# Patient Record
Sex: FEMALE | Race: BLACK OR AFRICAN AMERICAN | Employment: PART TIME | URBAN - METROPOLITAN AREA
[De-identification: names, ages, dates, MRNs, and addresses within clinical notes are randomized per-mention and may not be internally consistent; named-entity substitution may affect disease eponyms.]

---

## 2021-07-16 ENCOUNTER — HOSPITAL ENCOUNTER (EMERGENCY)
Age: 66
Discharge: HOME | End: 2021-07-17
Attending: EMERGENCY MEDICINE
Payer: MEDICARE

## 2021-07-16 DIAGNOSIS — E86.0 DEHYDRATION: Primary | ICD-10-CM

## 2021-07-16 DIAGNOSIS — R42 ORTHOSTATIC DIZZINESS: ICD-10-CM

## 2021-07-16 DIAGNOSIS — R25.1 TREMULOUSNESS: ICD-10-CM

## 2021-07-16 LAB
ANION GAP BLD CALC-SCNC: 12 MMOL/L (ref 10–20)
APPEARANCE UR: CLEAR
BACTERIA URNS QL MICRO: NEGATIVE /HPF
BASOPHILS # BLD: 0 K/UL (ref 0–0.1)
BASOPHILS NFR BLD: 1 % (ref 0–1)
BILIRUB UR QL: NEGATIVE
CA-I BLD-MCNC: 1.07 MMOL/L (ref 1.12–1.32)
CHLORIDE BLD-SCNC: 102 MMOL/L (ref 98–107)
CO2 BLD-SCNC: 25.9 MMOL/L (ref 21–32)
COLOR UR: ABNORMAL
CREAT BLD-MCNC: 0.66 MG/DL (ref 0.6–1.3)
DIFFERENTIAL METHOD BLD: NORMAL
EOSINOPHIL # BLD: 0 K/UL (ref 0–0.4)
EOSINOPHIL NFR BLD: 0 % (ref 0–7)
EPITH CASTS URNS QL MICRO: ABNORMAL /LPF
ERYTHROCYTE [DISTWIDTH] IN BLOOD BY AUTOMATED COUNT: 11.9 % (ref 11.5–14.5)
GLUCOSE BLD-MCNC: 119 MG/DL (ref 65–100)
GLUCOSE UR STRIP.AUTO-MCNC: NEGATIVE MG/DL
HCT VFR BLD AUTO: 37.9 % (ref 35–47)
HGB BLD-MCNC: 12.1 G/DL (ref 11.5–16)
HGB UR QL STRIP: NEGATIVE
HYALINE CASTS URNS QL MICRO: ABNORMAL /LPF (ref 0–5)
IMM GRANULOCYTES # BLD AUTO: 0 K/UL (ref 0–0.04)
IMM GRANULOCYTES NFR BLD AUTO: 0 % (ref 0–0.5)
KETONES UR QL STRIP.AUTO: ABNORMAL MG/DL
LEUKOCYTE ESTERASE UR QL STRIP.AUTO: NEGATIVE
LYMPHOCYTES # BLD: 1.5 K/UL (ref 0.8–3.5)
LYMPHOCYTES NFR BLD: 29 % (ref 12–49)
MCH RBC QN AUTO: 28.7 PG (ref 26–34)
MCHC RBC AUTO-ENTMCNC: 31.9 G/DL (ref 30–36.5)
MCV RBC AUTO: 89.8 FL (ref 80–99)
MONOCYTES # BLD: 0.5 K/UL (ref 0–1)
MONOCYTES NFR BLD: 9 % (ref 5–13)
NEUTS SEG # BLD: 3.1 K/UL (ref 1.8–8)
NEUTS SEG NFR BLD: 61 % (ref 32–75)
NITRITE UR QL STRIP.AUTO: NEGATIVE
NRBC # BLD: 0 K/UL (ref 0–0.01)
NRBC BLD-RTO: 0 PER 100 WBC
PH UR STRIP: 5.5 [PH] (ref 5–8)
PLATELET # BLD AUTO: 224 K/UL (ref 150–400)
PMV BLD AUTO: 10.6 FL (ref 8.9–12.9)
POTASSIUM BLD-SCNC: 4.3 MMOL/L (ref 3.5–5.1)
PROT UR STRIP-MCNC: NEGATIVE MG/DL
RBC # BLD AUTO: 4.22 M/UL (ref 3.8–5.2)
RBC #/AREA URNS HPF: ABNORMAL /HPF (ref 0–5)
SERVICE CMNT-IMP: ABNORMAL
SODIUM BLD-SCNC: 139 MMOL/L (ref 136–145)
SP GR UR REFRACTOMETRY: 1.02 (ref 1–1.03)
UA: UC IF INDICATED,UAUC: ABNORMAL
UROBILINOGEN UR QL STRIP.AUTO: 0.2 EU/DL (ref 0.2–1)
WBC # BLD AUTO: 5.1 K/UL (ref 3.6–11)
WBC URNS QL MICRO: ABNORMAL /HPF (ref 0–4)

## 2021-07-16 PROCEDURE — 93005 ELECTROCARDIOGRAM TRACING: CPT

## 2021-07-16 PROCEDURE — 81001 URINALYSIS AUTO W/SCOPE: CPT

## 2021-07-16 PROCEDURE — 99285 EMERGENCY DEPT VISIT HI MDM: CPT

## 2021-07-16 PROCEDURE — 36415 COLL VENOUS BLD VENIPUNCTURE: CPT

## 2021-07-16 PROCEDURE — 74011250637 HC RX REV CODE- 250/637: Performed by: EMERGENCY MEDICINE

## 2021-07-16 PROCEDURE — 80047 BASIC METABLC PNL IONIZED CA: CPT

## 2021-07-16 PROCEDURE — 74011250636 HC RX REV CODE- 250/636: Performed by: EMERGENCY MEDICINE

## 2021-07-16 PROCEDURE — 85025 COMPLETE CBC W/AUTO DIFF WBC: CPT

## 2021-07-16 RX ORDER — HYDROXYZINE 25 MG/1
25 TABLET, FILM COATED ORAL
Status: COMPLETED | OUTPATIENT
Start: 2021-07-16 | End: 2021-07-16

## 2021-07-16 RX ORDER — LISINOPRIL 40 MG/1
40 TABLET ORAL DAILY
COMMUNITY

## 2021-07-16 RX ORDER — ROSUVASTATIN CALCIUM 40 MG/1
40 TABLET, COATED ORAL
COMMUNITY

## 2021-07-16 RX ORDER — AMLODIPINE BESYLATE 10 MG/1
10 TABLET ORAL DAILY
COMMUNITY

## 2021-07-16 RX ORDER — ASPIRIN 325 MG
325 TABLET ORAL DAILY
COMMUNITY

## 2021-07-16 RX ADMIN — SODIUM CHLORIDE 500 ML: 9 INJECTION, SOLUTION INTRAVENOUS at 22:11

## 2021-07-16 RX ADMIN — HYDROXYZINE HYDROCHLORIDE 25 MG: 25 TABLET, FILM COATED ORAL at 22:11

## 2021-07-17 VITALS
HEART RATE: 90 BPM | RESPIRATION RATE: 18 BRPM | WEIGHT: 170 LBS | SYSTOLIC BLOOD PRESSURE: 119 MMHG | BODY MASS INDEX: 27.32 KG/M2 | TEMPERATURE: 97.7 F | DIASTOLIC BLOOD PRESSURE: 59 MMHG | HEIGHT: 66 IN | OXYGEN SATURATION: 96 %

## 2021-07-17 LAB
ATRIAL RATE: 103 BPM
CALCULATED P AXIS, ECG09: 46 DEGREES
CALCULATED R AXIS, ECG10: 4 DEGREES
CALCULATED T AXIS, ECG11: 27 DEGREES
DIAGNOSIS, 93000: NORMAL
P-R INTERVAL, ECG05: 130 MS
Q-T INTERVAL, ECG07: 342 MS
QRS DURATION, ECG06: 76 MS
QTC CALCULATION (BEZET), ECG08: 448 MS
VENTRICULAR RATE, ECG03: 103 BPM

## 2021-07-17 NOTE — DISCHARGE INSTRUCTIONS
It was a pleasure taking care of you in our Emergency Department today. We know that when you come to [facility], you are entrusting us with your health, comfort, and safety. Our physicians and nurses honor that trust, and truly appreciate the opportunity to care for you and your loved ones. We also value your feedback. If you receive a survey about your Emergency Department experience today, please fill it out. We care about our patients' feedback, and we listen to what you have to say. Thank you!       Dr. Valencia Godoy MD.

## 2021-07-17 NOTE — ED PROVIDER NOTES
EMERGENCY DEPARTMENT HISTORY AND PHYSICAL EXAM     ------------------------------------------------------------------------------------------------------  Please note that this dictation was completed with [employer], the [employer] voice recognition software. Quite often unanticipated grammatical, syntax, homophones, and other interpretive errors are inadvertently transcribed by the computer software. Please disregard these errors. Please excuse any errors that have escaped final proofreading.  -----------------------------------------------------------------------------------------------------------------    Date: 2021  Patient Name: Connie Fung    History of Presenting Illness     Chief Complaint   Patient presents with    Anxiety     Patient states that she feels \"jittery\" and \"off\" for the past hour. She feels \"woozy\" when she turns her head. Denies any N/V/D. Patient states that her sister just  and she may be anxious about that. History Provided By: Patient    HPI: Connie Fung is a 77 y.o. female, with significant pmhx of hypertension, who presents via private vehicle to the ED with c/o \"feeling off. \"  As well as the degree sensations been ongoing throughout today. Notes that she had a family member passed away and believes that her symptoms are attributed to this. Patient reports having told in the past of hypokalemia by her primary care doctor she occasionally gets intermittent foot cramping. Is unsure if her potassium is running low she has not filled her prescription for her potassium supplement yet. Denies associated chest pain, shortness of breath, nausea, vomiting, abdominal pain. PCP: Jodi Aggarwal MD    Social Hx: denies tobacco, denies EtOH, denies recreational/ Illicit Drugs     There are no other complaints, changes, or physical findings at this time.      No Known Allergies          Past History     Past Medical History:  Past Medical History:   Diagnosis Date    Hypertension        Past Surgical History:  History reviewed. No pertinent surgical history. Family History:  History reviewed. No pertinent family history. Social History:  Social History     Tobacco Use    Smoking status: Never Smoker    Smokeless tobacco: Never Used   Substance Use Topics    Alcohol use: Yes     Comment: everyday - drink beer and liquor    Drug use: Never       Allergies:  No Known Allergies      Review of Systems   Review of Systems   Constitutional: Negative. Negative for fever. Eyes: Negative. Respiratory: Negative. Negative for shortness of breath. Cardiovascular: Negative for chest pain. Gastrointestinal: Negative for abdominal pain, nausea and vomiting. Endocrine: Negative. Genitourinary: Negative. Negative for difficulty urinating, dysuria and hematuria. Musculoskeletal: Negative. Skin: Negative. Neurological: Positive for tremors (jittery feeling). Psychiatric/Behavioral: Negative for suicidal ideas. The patient is nervous/anxious. All other systems reviewed and are negative. Physical Exam   Physical Exam  Vitals and nursing note reviewed. Constitutional:       General: She is not in acute distress. Appearance: She is well-developed. She is not diaphoretic. HENT:      Head: Normocephalic and atraumatic. Nose: Nose normal.   Eyes:      General: No scleral icterus. Conjunctiva/sclera: Conjunctivae normal.   Neck:      Trachea: No tracheal deviation. Cardiovascular:      Rate and Rhythm: Normal rate and regular rhythm. Heart sounds: Normal heart sounds. No murmur heard. No friction rub. Pulmonary:      Effort: Pulmonary effort is normal. No respiratory distress. Breath sounds: Normal breath sounds. No stridor. No wheezing or rales. Abdominal:      General: Bowel sounds are normal. There is no distension. Palpations: Abdomen is soft. Tenderness: There is no abdominal tenderness. There is no rebound. Musculoskeletal:         General: No tenderness. Normal range of motion. Cervical back: Normal range of motion. Skin:     General: Skin is warm and dry. Findings: No rash. Neurological:      Mental Status: She is alert and oriented to person, place, and time. Cranial Nerves: No cranial nerve deficit. Psychiatric:         Mood and Affect: Mood is anxious. Speech: Speech normal.         Behavior: Behavior normal.         Thought Content: Thought content normal.         Judgment: Judgment normal.           Diagnostic Study Results     Labs -     Recent Results (from the past 12 hour(s))   POC CHEM8    Collection Time: 07/16/21  9:25 PM   Result Value Ref Range    Calcium, ionized (POC) 1.07 (L) 1.12 - 1.32 mmol/L    Sodium (POC) 139 136 - 145 mmol/L    Potassium (POC) 4.3 3.5 - 5.1 mmol/L    Chloride (POC) 102 98 - 107 mmol/L    CO2 (POC) 25.9 21 - 32 mmol/L    Anion gap (POC) 12 10 - 20 mmol/L    Glucose (POC) 119 (H) 65 - 100 mg/dL    Creatinine (POC) 0.66 0.6 - 1.3 mg/dL    GFRAA, POC >60 >60 ml/min/1.73m2    GFRNA, POC >60 >60 ml/min/1.73m2    Comment Comment Not Indicated.      URINALYSIS W/ REFLEX CULTURE    Collection Time: 07/16/21  9:31 PM    Specimen: Urine   Result Value Ref Range    Color YELLOW/STRAW      Appearance CLEAR CLEAR      Specific gravity 1.020 1.003 - 1.030      pH (UA) 5.5 5.0 - 8.0      Protein Negative NEG mg/dL    Glucose Negative NEG mg/dL    Ketone TRACE (A) NEG mg/dL    Bilirubin Negative NEG      Blood Negative NEG      Urobilinogen 0.2 0.2 - 1.0 EU/dL    Nitrites Negative NEG      Leukocyte Esterase Negative NEG      WBC 0-4 0 - 4 /hpf    RBC 0-5 0 - 5 /hpf    Epithelial cells FEW FEW /lpf    Bacteria Negative NEG /hpf    UA:UC IF INDICATED CULTURE NOT INDICATED BY UA RESULT CNI      Hyaline cast 0-2 0 - 5 /lpf   CBC WITH AUTOMATED DIFF    Collection Time: 07/16/21  9:31 PM   Result Value Ref Range    WBC 5.1 3.6 - 11.0 K/uL    RBC 4.22 3.80 - 5.20 M/uL HGB 12.1 11.5 - 16.0 g/dL    HCT 37.9 35.0 - 47.0 %    MCV 89.8 80.0 - 99.0 FL    MCH 28.7 26.0 - 34.0 PG    MCHC 31.9 30.0 - 36.5 g/dL    RDW 11.9 11.5 - 14.5 %    PLATELET 697 320 - 447 K/uL    MPV 10.6 8.9 - 12.9 FL    NRBC 0.0 0  WBC    ABSOLUTE NRBC 0.00 0.00 - 0.01 K/uL    NEUTROPHILS 61 32 - 75 %    LYMPHOCYTES 29 12 - 49 %    MONOCYTES 9 5 - 13 %    EOSINOPHILS 0 0 - 7 %    BASOPHILS 1 0 - 1 %    IMMATURE GRANULOCYTES 0 0.0 - 0.5 %    ABS. NEUTROPHILS 3.1 1.8 - 8.0 K/UL    ABS. LYMPHOCYTES 1.5 0.8 - 3.5 K/UL    ABS. MONOCYTES 0.5 0.0 - 1.0 K/UL    ABS. EOSINOPHILS 0.0 0.0 - 0.4 K/UL    ABS. BASOPHILS 0.0 0.0 - 0.1 K/UL    ABS. IMM. GRANS. 0.0 0.00 - 0.04 K/UL    DF AUTOMATED     EKG, 12 LEAD, INITIAL    Collection Time: 07/16/21  9:42 PM   Result Value Ref Range    Ventricular Rate 103 BPM    Atrial Rate 103 BPM    P-R Interval 130 ms    QRS Duration 76 ms    Q-T Interval 342 ms    QTC Calculation (Bezet) 448 ms    Calculated P Axis 46 degrees    Calculated R Axis 4 degrees    Calculated T Axis 27 degrees    Diagnosis       Sinus tachycardia  Minimal voltage criteria for LVH, may be normal variant  When compared with ECG of 25-OCT-2004 12:22,  Previous ECG has undetermined rhythm, needs review         Radiologic Studies -   No orders to display     CT Results  (Last 48 hours)    None        CXR Results  (Last 48 hours)    None            Medical Decision Making   I am the first provider for this patient. I reviewed the vital signs, available nursing notes, past medical history, past surgical history, family history and social history. Vital Signs-Reviewed the patient's vital signs.   Patient Vitals for the past 12 hrs:   Temp Pulse Resp BP SpO2   07/16/21 2334  99 17  98 %   07/16/21 2330  (!) 102 21 128/66 98 %   07/16/21 2328    (!) 143/78    07/16/21 2327    (!) 145/69    07/16/21 2327    (!) 144/62    07/16/21 2259  (!) 107 20  98 %   07/16/21 2258  87 17  96 %   07/16/21 2245  96 15 123/60 96 %   07/16/21 2154  (!) 102 19  98 %   07/16/21 2005 97.7 °F (36.5 °C) (!) 129 20 (!) 170/89 98 %       Pulse Oximetry Analysis - 98% on RA Normal      Provider Notes (Medical Decision Making):     DDX:  Grieving process, electrolyte abnormality, dehydration, UTI, arrhythmia    Plan:  Labs, UA, EKG    Impression:  Orthostatic, dehydration    ED Course:   Initial assessment performed. The patients presenting problems have been discussed, and they are in agreement with the care plan formulated and outlined with them. I have encouraged them to ask questions as they arise throughout their visit. I reviewed our electronic medical record system for any past medical records that were available that may contribute to the patients current condition, the nursing notes and and vital signs from today's visit  Nursing notes will be reviewed as they become available in realtime while the pt has been in the ED. Marnie Clark MD    I personally reviewed/interpreted pt's imaging. Agree with official read by radiology as noted above. Marnie Clark MD      11:51 PM  Progress note:  Pt noted to be feeling better, ready for discharge. Discussed lab and imaging findings with pt, specifically noting normal potassium. Pt will follow up with primary care as instructed. All questions have been answered, pt voiced understanding and agreement with plan. Specific return precautions provided in addition to instructions for pt to return to the ED immediately should sx worsen at any time. Marnie Clark MD             Critical Care Time:     none      Diagnosis     Clinical Impression:   1. Dehydration    2. Tremulousness    3. Orthostatic dizziness        PLAN:  1. Current Discharge Medication List        2.    Follow-up Information     Follow up With Specialties Details Why Cassandra Farah MD Internal Medicine Schedule an appointment as soon as possible for a visit in 2 days  [address] [facility]  [phone]          Return to ED if worse     Disposition:    11:51 PM   The patient's results have been reviewed with family and/or caregiver. They verbally convey their understanding and agreement of the patient's signs, symptoms, diagnosis, treatment and prognosis and additionally agree to follow up as recommended in the discharge instructions or to return to the Emergency Room should the patient's condition change prior to their follow-up appointment. The family and/or caregiver verbally agrees with the care-plan and all of their questions have been answered. The discharge instructions have also been provided to the them with educational information regarding the patient's diagnosis as well a list of reasons why the patient would want to return to the ER prior to their follow-up appointment should their condition change.   Kem Hairston MD

## 2021-07-17 NOTE — ED NOTES
Pt presents to the ED for jitteriness and feeling \"off\". Pt states symptoms started 2 hours ago. Pt states she had a sister that just passed away and she feels like her symptoms might be related to that.

## 2021-11-04 ENCOUNTER — TRANSCRIBE ORDER (OUTPATIENT)
Dept: SCHEDULING | Age: 66
End: 2021-11-04

## 2021-11-04 DIAGNOSIS — M77.9 TENDINITIS: Primary | ICD-10-CM

## 2021-11-20 ENCOUNTER — HOSPITAL ENCOUNTER (OUTPATIENT)
Dept: MRI IMAGING | Age: 66
Discharge: HOME | End: 2021-11-20
Attending: ORTHOPAEDIC SURGERY

## 2021-11-20 DIAGNOSIS — M77.9 TENDINITIS: ICD-10-CM

## 2021-12-02 ENCOUNTER — APPOINTMENT (OUTPATIENT)
Dept: CT IMAGING | Age: 66
End: 2021-12-02
Attending: EMERGENCY MEDICINE
Payer: MEDICARE

## 2021-12-02 ENCOUNTER — APPOINTMENT (OUTPATIENT)
Dept: GENERAL RADIOLOGY | Age: 66
End: 2021-12-02
Attending: EMERGENCY MEDICINE
Payer: MEDICARE

## 2021-12-02 ENCOUNTER — HOSPITAL ENCOUNTER (EMERGENCY)
Age: 66
Discharge: HOME | End: 2021-12-02
Attending: EMERGENCY MEDICINE
Payer: MEDICARE

## 2021-12-02 VITALS
OXYGEN SATURATION: 98 % | DIASTOLIC BLOOD PRESSURE: 81 MMHG | RESPIRATION RATE: 18 BRPM | SYSTOLIC BLOOD PRESSURE: 127 MMHG | TEMPERATURE: 98.5 F | HEIGHT: 66 IN | HEART RATE: 134 BPM | BODY MASS INDEX: 26.61 KG/M2 | WEIGHT: 165.57 LBS

## 2021-12-02 DIAGNOSIS — J18.9 PNEUMONIA OF LEFT UPPER LOBE DUE TO INFECTIOUS ORGANISM: Primary | ICD-10-CM

## 2021-12-02 LAB
ALBUMIN SERPL-MCNC: 2.6 G/DL (ref 3.5–5)
ALBUMIN/GLOB SERPL: 0.4 {RATIO} (ref 1.1–2.2)
ALP SERPL-CCNC: 69 U/L (ref 45–117)
ALT SERPL-CCNC: 21 U/L (ref 12–78)
ANION GAP SERPL CALC-SCNC: 6 MMOL/L (ref 5–15)
AST SERPL-CCNC: 33 U/L (ref 15–37)
ATRIAL RATE: 121 BPM
BASOPHILS # BLD: 0 K/UL (ref 0–0.1)
BASOPHILS NFR BLD: 0 % (ref 0–1)
BILIRUB SERPL-MCNC: 0.7 MG/DL (ref 0.2–1)
BNP SERPL-MCNC: 107 PG/ML
BUN SERPL-MCNC: 10 MG/DL (ref 6–20)
BUN/CREAT SERPL: 12 (ref 12–20)
CALCIUM SERPL-MCNC: 9.1 MG/DL (ref 8.5–10.1)
CALCULATED P AXIS, ECG09: 43 DEGREES
CALCULATED R AXIS, ECG10: 11 DEGREES
CALCULATED T AXIS, ECG11: 47 DEGREES
CHLORIDE SERPL-SCNC: 102 MMOL/L (ref 97–108)
CO2 SERPL-SCNC: 26 MMOL/L (ref 21–32)
CREAT SERPL-MCNC: 0.81 MG/DL (ref 0.55–1.02)
DIAGNOSIS, 93000: NORMAL
DIFFERENTIAL METHOD BLD: ABNORMAL
EOSINOPHIL # BLD: 0 K/UL (ref 0–0.4)
EOSINOPHIL NFR BLD: 0 % (ref 0–7)
ERYTHROCYTE [DISTWIDTH] IN BLOOD BY AUTOMATED COUNT: 11.6 % (ref 11.5–14.5)
GLOBULIN SER CALC-MCNC: 6.3 G/DL (ref 2–4)
GLUCOSE SERPL-MCNC: 213 MG/DL (ref 65–100)
HCT VFR BLD AUTO: 35 % (ref 35–47)
HGB BLD-MCNC: 11.4 G/DL (ref 11.5–16)
IMM GRANULOCYTES # BLD AUTO: 0.1 K/UL (ref 0–0.04)
IMM GRANULOCYTES NFR BLD AUTO: 1 % (ref 0–0.5)
LACTATE SERPL-SCNC: 1 MMOL/L (ref 0.4–2)
LYMPHOCYTES # BLD: 0.7 K/UL (ref 0.8–3.5)
LYMPHOCYTES NFR BLD: 6 % (ref 12–49)
MCH RBC QN AUTO: 28.7 PG (ref 26–34)
MCHC RBC AUTO-ENTMCNC: 32.6 G/DL (ref 30–36.5)
MCV RBC AUTO: 88.2 FL (ref 80–99)
MONOCYTES # BLD: 0.8 K/UL (ref 0–1)
MONOCYTES NFR BLD: 7 % (ref 5–13)
NEUTS SEG # BLD: 9.8 K/UL (ref 1.8–8)
NEUTS SEG NFR BLD: 86 % (ref 32–75)
NRBC # BLD: 0 K/UL (ref 0–0.01)
NRBC BLD-RTO: 0 PER 100 WBC
P-R INTERVAL, ECG05: 130 MS
PLATELET # BLD AUTO: 274 K/UL (ref 150–400)
PMV BLD AUTO: 10.6 FL (ref 8.9–12.9)
POTASSIUM SERPL-SCNC: 5 MMOL/L (ref 3.5–5.1)
PROT SERPL-MCNC: 8.9 G/DL (ref 6.4–8.2)
Q-T INTERVAL, ECG07: 302 MS
QRS DURATION, ECG06: 74 MS
QTC CALCULATION (BEZET), ECG08: 428 MS
RBC # BLD AUTO: 3.97 M/UL (ref 3.8–5.2)
RBC MORPH BLD: ABNORMAL
SODIUM SERPL-SCNC: 134 MMOL/L (ref 136–145)
TROPONIN-HIGH SENSITIVITY: <4 NG/L (ref 0–51)
VENTRICULAR RATE, ECG03: 121 BPM
WBC # BLD AUTO: 11.4 K/UL (ref 3.6–11)
WBC MORPH BLD: ABNORMAL

## 2021-12-02 PROCEDURE — 83605 ASSAY OF LACTIC ACID: CPT

## 2021-12-02 PROCEDURE — 74011000636 HC RX REV CODE- 636: Performed by: EMERGENCY MEDICINE

## 2021-12-02 PROCEDURE — 71045 X-RAY EXAM CHEST 1 VIEW: CPT

## 2021-12-02 PROCEDURE — 84484 ASSAY OF TROPONIN QUANT: CPT

## 2021-12-02 PROCEDURE — 74011250636 HC RX REV CODE- 250/636: Performed by: EMERGENCY MEDICINE

## 2021-12-02 PROCEDURE — 71275 CT ANGIOGRAPHY CHEST: CPT

## 2021-12-02 PROCEDURE — 93005 ELECTROCARDIOGRAM TRACING: CPT

## 2021-12-02 PROCEDURE — 74011000258 HC RX REV CODE- 258: Performed by: EMERGENCY MEDICINE

## 2021-12-02 PROCEDURE — 74011250637 HC RX REV CODE- 250/637: Performed by: EMERGENCY MEDICINE

## 2021-12-02 PROCEDURE — 83880 ASSAY OF NATRIURETIC PEPTIDE: CPT

## 2021-12-02 PROCEDURE — 85025 COMPLETE CBC W/AUTO DIFF WBC: CPT

## 2021-12-02 PROCEDURE — 80053 COMPREHEN METABOLIC PANEL: CPT

## 2021-12-02 PROCEDURE — 99284 EMERGENCY DEPT VISIT MOD MDM: CPT

## 2021-12-02 PROCEDURE — U0003 INFECTIOUS AGENT DETECTION BY NUCLEIC ACID (DNA OR RNA); SEVERE ACUTE RESPIRATORY SYNDROME CORONAVIRUS 2 (SARS-COV-2) (CORONAVIRUS DISEASE [COVID-19]), AMPLIFIED PROBE TECHNIQUE, MAKING USE OF HIGH THROUGHPUT TECHNOLOGIES AS DESCRIBED BY CMS-2020-01-R: HCPCS

## 2021-12-02 PROCEDURE — 87040 BLOOD CULTURE FOR BACTERIA: CPT

## 2021-12-02 PROCEDURE — 36415 COLL VENOUS BLD VENIPUNCTURE: CPT

## 2021-12-02 PROCEDURE — 96365 THER/PROPH/DIAG IV INF INIT: CPT

## 2021-12-02 RX ORDER — DOXYCYCLINE HYCLATE 100 MG
100 TABLET ORAL
Status: COMPLETED | OUTPATIENT
Start: 2021-12-02 | End: 2021-12-02

## 2021-12-02 RX ORDER — AMOXICILLIN AND CLAVULANATE POTASSIUM 875; 125 MG/1; MG/1
1 TABLET, FILM COATED ORAL 2 TIMES DAILY
Qty: 10 TABLET | Refills: 0 | Status: SHIPPED | OUTPATIENT
Start: 2021-12-02 | End: 2022-05-30

## 2021-12-02 RX ORDER — DOXYCYCLINE HYCLATE 100 MG
100 TABLET ORAL 2 TIMES DAILY
Qty: 14 TABLET | Refills: 0 | Status: SHIPPED | OUTPATIENT
Start: 2021-12-02 | End: 2021-12-09

## 2021-12-02 RX ADMIN — IOPAMIDOL 56 ML: 755 INJECTION, SOLUTION INTRAVENOUS at 12:17

## 2021-12-02 RX ADMIN — DOXYCYCLINE HYCLATE 100 MG: 100 TABLET, COATED ORAL at 12:31

## 2021-12-02 RX ADMIN — CEFTRIAXONE 1 G: 1 INJECTION, POWDER, FOR SOLUTION INTRAMUSCULAR; INTRAVENOUS at 12:31

## 2021-12-02 RX ADMIN — SODIUM CHLORIDE 1000 ML: 9 INJECTION, SOLUTION INTRAVENOUS at 12:31

## 2021-12-02 NOTE — ED PROVIDER NOTES
EMERGENCY DEPARTMENT HISTORY AND PHYSICAL EXAM      Date: 12/2/2021  Patient Name: Tracie Diaz    History of Presenting Illness     Chief Complaint   Patient presents with    Chest Pain     Pt arrives ambulatory to triage with CC of chest pain starting last night. Patient reprots pain was on both sides of chest at first but now primarily on the L side of chest. Patient reprots she has been experiencing weakness and cold symtpoms starting last Saturday. Tested negative for COVID yesterday. History Provided By: Patient    HPI: Tao Torres, 77 y.o. female presents to the ED with cc of illness. Patient reports symptoms began last Saturday, approximately 5 days ago. Reports URI symptoms, feeling bad. Reports tested for COVID Monday, negative. Patient reports general weakness. Patient reports last night developed pleuritic chest pain, both sides of chest \"sore. \" More left sided. Worse with walking and breathing. Pressure. Intermittent. + cough, no hemoptysis. + fever and chills. Diarrhea. There are no other complaints, changes, or physical findings at this time. PCP: Marianna Hannah MD    No current facility-administered medications on file prior to encounter. Current Outpatient Medications on File Prior to Encounter   Medication Sig Dispense Refill    amLODIPine (NORVASC) 10 mg tablet Take 10 mg by mouth daily.  lisinopriL (PRINIVIL, ZESTRIL) 40 mg tablet Take 40 mg by mouth daily.  aspirin (ASPIRIN) 325 mg tablet Take 325 mg by mouth daily.  rosuvastatin (CRESTOR) 40 mg tablet Take 40 mg by mouth nightly. Past History     Past Medical History:  Past Medical History:   Diagnosis Date    Hypertension        Past Surgical History:  Reviewed, non-contributory     Family History:  No family history on file. Social History:  Social History     Tobacco Use    Smoking status: Never Smoker    Smokeless tobacco: Never Used   Substance Use Topics    Alcohol use:  Yes Comment: everyday - drink beer and liquor    Drug use: Never       Allergies:  No Known Allergies      Review of Systems   Review of Systems   Constitutional: Positive for fever. Negative for activity change and chills. HENT: Negative for facial swelling and voice change. Eyes: Negative for redness. Respiratory: Positive for cough and shortness of breath. Negative for wheezing. Cardiovascular: Positive for chest pain. Negative for leg swelling. Gastrointestinal: Positive for diarrhea. Negative for abdominal pain, nausea and vomiting. Genitourinary: Negative for decreased urine volume. Musculoskeletal: Negative for gait problem. Skin: Negative for pallor and rash. Neurological: Negative for tremors and facial asymmetry. Psychiatric/Behavioral: Negative for agitation. All other systems reviewed and are negative. Physical Exam   Physical Exam  Vitals and nursing note reviewed. Constitutional:       Comments: 77 YOF, resting in bed, appears quite well   HENT:      Head: Normocephalic and atraumatic. Cardiovascular:      Rate and Rhythm: Regular rhythm. Tachycardia present. Pulses:           Radial pulses are 2+ on the right side. Heart sounds: No murmur heard. No friction rub. No gallop. Pulmonary:      Effort: Pulmonary effort is normal.      Breath sounds: Examination of the left-upper field reveals decreased breath sounds and rhonchi. Decreased breath sounds and rhonchi present. Abdominal:      Palpations: Abdomen is soft. Tenderness: There is no abdominal tenderness. Musculoskeletal:         General: Normal range of motion. Cervical back: Normal range of motion. Right lower leg: No edema. Left lower leg: No edema. Skin:     General: Skin is warm. Capillary Refill: Capillary refill takes less than 2 seconds. Neurological:      General: No focal deficit present. Mental Status: She is alert.    Psychiatric:         Mood and Affect: Mood normal.         Diagnostic Study Results     Labs -     Recent Results (from the past 12 hour(s))   EKG, 12 LEAD, INITIAL    Collection Time: 12/02/21  8:57 AM   Result Value Ref Range    Ventricular Rate 121 BPM    Atrial Rate 121 BPM    P-R Interval 130 ms    QRS Duration 74 ms    Q-T Interval 302 ms    QTC Calculation (Bezet) 428 ms    Calculated P Axis 43 degrees    Calculated R Axis 11 degrees    Calculated T Axis 47 degrees    Diagnosis       Sinus tachycardia  Possible Left atrial enlargement  When compared with ECG of 16-JUL-2021 21:42,  No significant change was found  Confirmed by Dai Hilton ([id number]) on 12/2/2021 10:29:09 AM     CBC WITH AUTOMATED DIFF    Collection Time: 12/02/21  9:19 AM   Result Value Ref Range    WBC 11.4 (H) 3.6 - 11.0 K/uL    RBC 3.97 3.80 - 5.20 M/uL    HGB 11.4 (L) 11.5 - 16.0 g/dL    HCT 35.0 35.0 - 47.0 %    MCV 88.2 80.0 - 99.0 FL    MCH 28.7 26.0 - 34.0 PG    MCHC 32.6 30.0 - 36.5 g/dL    RDW 11.6 11.5 - 14.5 %    PLATELET 636 483 - 135 K/uL    MPV 10.6 8.9 - 12.9 FL    NRBC 0.0 0  WBC    ABSOLUTE NRBC 0.00 0.00 - 0.01 K/uL    NEUTROPHILS 86 (H) 32 - 75 %    LYMPHOCYTES 6 (L) 12 - 49 %    MONOCYTES 7 5 - 13 %    EOSINOPHILS 0 0 - 7 %    BASOPHILS 0 0 - 1 %    IMMATURE GRANULOCYTES 1 (H) 0.0 - 0.5 %    ABS. NEUTROPHILS 9.8 (H) 1.8 - 8.0 K/UL    ABS. LYMPHOCYTES 0.7 (L) 0.8 - 3.5 K/UL    ABS. MONOCYTES 0.8 0.0 - 1.0 K/UL    ABS. EOSINOPHILS 0.0 0.0 - 0.4 K/UL    ABS. BASOPHILS 0.0 0.0 - 0.1 K/UL    ABS. IMM.  GRANS. 0.1 (H) 0.00 - 0.04 K/UL    DF SMEAR SCANNED      RBC COMMENTS NORMOCYTIC, NORMOCHROMIC      WBC COMMENTS VACUOLATED POLYS     METABOLIC PANEL, COMPREHENSIVE    Collection Time: 12/02/21  9:19 AM   Result Value Ref Range    Sodium 134 (L) 136 - 145 mmol/L    Potassium 5.0 3.5 - 5.1 mmol/L    Chloride 102 97 - 108 mmol/L    CO2 26 21 - 32 mmol/L    Anion gap 6 5 - 15 mmol/L    Glucose 213 (H) 65 - 100 mg/dL    BUN 10 6 - 20 MG/DL    Creatinine 0.81 0.55 - 1.02 MG/DL    BUN/Creatinine ratio 12 12 - 20      GFR est AA >60 >60 ml/min/1.73m2    GFR est non-AA >60 >60 ml/min/1.73m2    Calcium 9.1 8.5 - 10.1 MG/DL    Bilirubin, total 0.7 0.2 - 1.0 MG/DL    ALT (SGPT) 21 12 - 78 U/L    AST (SGOT) 33 15 - 37 U/L    Alk. phosphatase 69 45 - 117 U/L    Protein, total 8.9 (H) 6.4 - 8.2 g/dL    Albumin 2.6 (L) 3.5 - 5.0 g/dL    Globulin 6.3 (H) 2.0 - 4.0 g/dL    A-G Ratio 0.4 (L) 1.1 - 2.2     NT-PRO BNP    Collection Time: 12/02/21  9:19 AM   Result Value Ref Range    NT pro- <125 PG/ML   TROPONIN-HIGH SENSITIVITY    Collection Time: 12/02/21  9:19 AM   Result Value Ref Range    Troponin-High Sensitivity <4 0 - 51 ng/L   LACTIC ACID    Collection Time: 12/02/21 12:03 PM   Result Value Ref Range    Lactic acid 1.0 0.4 - 2.0 MMOL/L       Radiologic Studies -   CTA CHEST W OR W WO CONT   Final Result   1. Bilateral nonspecific pneumonia, severe in the left upper lobe. Consider   COVID19  testing if not recently performed. 2. No pulmonary embolism. No evidence of pulmonary infarct. 3. Reactive lymph nodes. 4. Trace left pleural effusion. Recommend followup noncontrast CT chest in 8-10 weeks to evaluate for change. XR CHEST PORT   Final Result   Small left effusion and patchy infiltrate left upper lobe. CT Results  (Last 48 hours)               12/02/21 1217  CTA CHEST W OR W WO CONT Final result    Impression:  1. Bilateral nonspecific pneumonia, severe in the left upper lobe. Consider   COVID19  testing if not recently performed. 2. No pulmonary embolism. No evidence of pulmonary infarct. 3. Reactive lymph nodes. 4. Trace left pleural effusion. Recommend followup noncontrast CT chest in 8-10 weeks to evaluate for change. Narrative:  EXAM:  CTA CHEST W OR W WO CONT       INDICATION: Left upper lobe airspace opacity. Hypertension. COMPARISON: None.        TECHNIQUE: Helical thin section chest CT following uneventful intravenous   administration of nonionic contrast 56 mL of isovue 370 according to   departmental PE protocol. Coronal and sagittal reformats were performed. 3D post   processing was performed. CT dose reduction was achieved through the use of a   standardized protocol tailored for this examination and automatic exposure   control for dose modulation. FINDINGS: This is a good quality study for the evaluation of pulmonary embolism   to the first subsegmental arterial level. There is no pulmonary embolism to this   level. Miscellaneous: There are reactive right axillary lymph nodes. Largest measures 1   cm in short axis. THYROID: No nodule. MEDIASTINUM: Multiple reactive lymph nodes. Inferior left paratracheal lymph   node measures 0.9 cm in short axis. Subcarinal lymph node measures 0.9 cm in   short axis. MILKA: Conglomerate of left hilar lymphadenopathy measures approximately 1.3 cm   in short axis. Right hilar lymph node measures 0.9 cm in short axis. THORACIC AORTA: No aneurysm. HEART: Normal in size. ESOPHAGUS: No wall thickening or dilatation. TRACHEA/BRONCHI: Patent. PLEURA: Trace left pleural effusion. No pneumothorax. LUNGS: Heterogeneous airspace opacity with air bronchograms in the left upper   lobe abuts the left hilum, left mediastinum, and left major fissure. Patchy   airspace opacities in the right middle lobe, right lower lobe, and left lower   lobe or more peripheral than central. No suspicious nodule. No measurable lung   mass. UPPER ABDOMEN: Partially imaged. No acute pathology. BONES: No aggressive bone lesion or fracture. CXR Results  (Last 48 hours)               12/02/21 1023  XR CHEST PORT Final result    Impression:  Small left effusion and patchy infiltrate left upper lobe. Narrative: Indication: Chest pain       Comparison: None       Portable exam of the chest obtained at 1023 demonstrates normal heart size.    There is a small left pleural effusion. Patchy infiltrate is noted in the left   upper lobe. The osseous structures are unremarkable. Medical Decision Making   I am the first provider for this patient. I reviewed the vital signs, available nursing notes, past medical history, past surgical history, family history and social history. Vital Signs-Reviewed the patient's vital signs. Patient Vitals for the past 12 hrs:   Temp Pulse Resp BP SpO2   12/02/21 0845 98.5 °F (36.9 °C) (!) 134 18 127/81 98 %     Records Reviewed: Nursing Notes and Old Medical Records    Provider Notes (Medical Decision Making):     77 YOF presents to the ED with a chief complaint of feeling unwell associated with atypical chest pain. VS notable for tachycardia. Triage work-up is notable for mild leukocytosis with reassuring troponin. EKG is nonischemic and chest pain is atypical given his pleuritic nature. X-ray shows a left upper lobe infiltrate with effusion which is likely the etiology of the patient's chest pain. Suspect this is community-acquired pneumonia as has Covid test was negative, however as patient is persistently tachycardic believe reasonable to check CTA to rule out pulmonary infarct and PE. Will give fluids check point-of-care lactate and hold blood cultures. Antibiotics. ED Course:   Initial assessment performed. The patients presenting problems have been discussed, and they are in agreement with the care plan formulated and outlined with them. I have encouraged them to ask questions as they arise throughout their visit. ED Course as of 12/02/21 1914   u Dec 02, 2021   1114 Preliminary EKG interpreted by me. Shows sinus tachycardia with a HR of 121. No ST elevations or depressions concerning for ischemia. Normal intervals. [MB]   5 CT shows pneumonia, lactic reassuring. [MB]   0338 On reassessment, patient reports she feels better. Her heart rate has improved to the upper 90s.   CT without PE. [MB] ED Course User Index  [MB] Johnathan Waters MD     Will discharge with abx, PMD follow-up. Strict return precautions discussed. Labs reviewed and large unremarkable with exception of mild leukocytosis. Kasandra Curran MD      Disposition:    Discharged    DISCHARGE PLAN:  1. Discharge Medication List as of 12/2/2021  1:49 PM      START taking these medications    Details   amoxicillin-clavulanate (Augmentin) 875-125 mg per tablet Take 1 Tablet by mouth two (2) times a day., Normal, Disp-10 Tablet, R-0      doxycycline (VIBRA-TABS) 100 mg tablet Take 1 Tablet by mouth two (2) times a day for 7 days. , Normal, Disp-14 Tablet, R-0         CONTINUE these medications which have NOT CHANGED    Details   amLODIPine (NORVASC) 10 mg tablet Take 10 mg by mouth daily. , Historical Med      lisinopriL (PRINIVIL, ZESTRIL) 40 mg tablet Take 40 mg by mouth daily. , Historical Med      aspirin (ASPIRIN) 325 mg tablet Take 325 mg by mouth daily. , Historical Med      rosuvastatin (CRESTOR) 40 mg tablet Take 40 mg by mouth nightly., Historical Med           2. Follow-up Information     Follow up With Specialties Details Why Contact Info    Beatriz Edwards MD Internal Medicine In 3 days  [address] [place] [address]  [phone]      [facility] EMERGENCY DEPT Emergency Medicine  If symptoms worsen [address]  [address] [facility]  [phone]        3. Return to ED if worse     Diagnosis     Clinical Impression:   1. Pneumonia of left upper lobe due to infectious organism        Attestations:    Denton Wahl MD    Please note that this dictation was completed with [employer], the [employer] voice recognition software. Quite often unanticipated grammatical, syntax, homophones, and other interpretive errors are inadvertently transcribed by the computer software. Please disregard these errors. Please excuse any errors that have escaped final proofreading. Thank you.

## 2021-12-02 NOTE — ED NOTES
Per MD ok to discharge without urine specimen. Pt discharged by Dr Bárbara Gilbert. Pt provided with discharge instructions Rx and instructions on follow up care.  Pt ambulatory out of ED

## 2021-12-02 NOTE — DISCHARGE INSTRUCTIONS
You were seen in the ER for your symptoms. Please follow-up with your primary care doctor. Your CAT scan showed a pneumonia. Please follow-up for a repeat xray or CAT scan in 10 weeks. Please return for worsening symptoms at any time.

## 2021-12-03 ENCOUNTER — PATIENT OUTREACH (OUTPATIENT)
Dept: CASE MANAGEMENT | Age: 66
End: 2021-12-03

## 2021-12-03 LAB
SARS-COV-2, XPLCVT: NOT DETECTED
SOURCE, COVRS: NORMAL

## 2021-12-07 LAB
BACTERIA SPEC CULT: NORMAL
SERVICE CMNT-IMP: NORMAL

## 2022-04-06 ENCOUNTER — TRANSCRIBE ORDER (OUTPATIENT)
Dept: SCHEDULING | Age: 67
End: 2022-04-06

## 2022-04-06 DIAGNOSIS — R93.89 ABNORMAL CT SCAN, CHEST: Primary | ICD-10-CM

## 2022-04-06 DIAGNOSIS — Z86.010 PERSONAL HISTORY OF COLONIC POLYPS: ICD-10-CM

## 2022-04-06 DIAGNOSIS — K83.8 DILATED INTRAHEPATIC BILE DUCT: ICD-10-CM

## 2022-04-21 ENCOUNTER — HOSPITAL ENCOUNTER (OUTPATIENT)
Dept: MRI IMAGING | Age: 67
Discharge: HOME | End: 2022-04-21
Attending: NURSE PRACTITIONER
Payer: MEDICARE

## 2022-04-21 DIAGNOSIS — R93.89 ABNORMAL CT SCAN, CHEST: ICD-10-CM

## 2022-04-21 DIAGNOSIS — K83.8 DILATED INTRAHEPATIC BILE DUCT: ICD-10-CM

## 2022-04-21 DIAGNOSIS — Z86.010 PERSONAL HISTORY OF COLONIC POLYPS: ICD-10-CM

## 2022-04-21 PROCEDURE — A9577 INJ MULTIHANCE: HCPCS | Performed by: NURSE PRACTITIONER

## 2022-04-21 PROCEDURE — 74011250636 HC RX REV CODE- 250/636: Performed by: NURSE PRACTITIONER

## 2022-04-21 PROCEDURE — 74183 MRI ABD W/O CNTR FLWD CNTR: CPT

## 2022-04-21 RX ADMIN — GADOBENATE DIMEGLUMINE 15 ML: 529 INJECTION, SOLUTION INTRAVENOUS at 17:53

## 2022-05-30 RX ORDER — POTASSIUM CHLORIDE 750 MG/1
10 TABLET, FILM COATED, EXTENDED RELEASE ORAL DAILY
COMMUNITY

## 2022-06-01 ENCOUNTER — ANESTHESIA EVENT (OUTPATIENT)
Dept: ENDOSCOPY | Age: 67
End: 2022-06-01
Payer: MEDICARE

## 2022-06-01 ENCOUNTER — ANESTHESIA (OUTPATIENT)
Dept: ENDOSCOPY | Age: 67
End: 2022-06-01
Payer: MEDICARE

## 2022-06-01 ENCOUNTER — HOSPITAL ENCOUNTER (OUTPATIENT)
Age: 67
Setting detail: OUTPATIENT SURGERY
Discharge: HOME | End: 2022-06-01
Attending: INTERNAL MEDICINE | Admitting: INTERNAL MEDICINE
Payer: MEDICARE

## 2022-06-01 VITALS
HEIGHT: 65 IN | BODY MASS INDEX: 27.29 KG/M2 | HEART RATE: 74 BPM | SYSTOLIC BLOOD PRESSURE: 123 MMHG | TEMPERATURE: 98.3 F | OXYGEN SATURATION: 99 % | DIASTOLIC BLOOD PRESSURE: 64 MMHG | RESPIRATION RATE: 21 BRPM | WEIGHT: 163.8 LBS

## 2022-06-01 PROCEDURE — 76060000031 HC ANESTHESIA FIRST 0.5 HR: Performed by: INTERNAL MEDICINE

## 2022-06-01 PROCEDURE — 74011250636 HC RX REV CODE- 250/636: Performed by: NURSE ANESTHETIST, CERTIFIED REGISTERED

## 2022-06-01 PROCEDURE — 88305 TISSUE EXAM BY PATHOLOGIST: CPT

## 2022-06-01 PROCEDURE — 77030013992 HC SNR POLYP ENDOSC BSC -B: Performed by: INTERNAL MEDICINE

## 2022-06-01 PROCEDURE — 2709999900 HC NON-CHARGEABLE SUPPLY: Performed by: INTERNAL MEDICINE

## 2022-06-01 PROCEDURE — 76040000019: Performed by: INTERNAL MEDICINE

## 2022-06-01 PROCEDURE — 77030013996 HC SNR POLYP ENDOSC OCOA -B: Performed by: INTERNAL MEDICINE

## 2022-06-01 RX ORDER — ATROPINE SULFATE 0.1 MG/ML
0.5 INJECTION INTRAVENOUS
Status: DISCONTINUED | OUTPATIENT
Start: 2022-06-01 | End: 2022-06-01 | Stop reason: HOSPADM

## 2022-06-01 RX ORDER — NALOXONE HYDROCHLORIDE 0.4 MG/ML
0.4 INJECTION, SOLUTION INTRAMUSCULAR; INTRAVENOUS; SUBCUTANEOUS
Status: DISCONTINUED | OUTPATIENT
Start: 2022-06-01 | End: 2022-06-01 | Stop reason: HOSPADM

## 2022-06-01 RX ORDER — FLUMAZENIL 0.1 MG/ML
0.2 INJECTION INTRAVENOUS
Status: DISCONTINUED | OUTPATIENT
Start: 2022-06-01 | End: 2022-06-01 | Stop reason: HOSPADM

## 2022-06-01 RX ORDER — SODIUM CHLORIDE 9 MG/ML
INJECTION, SOLUTION INTRAVENOUS
Status: DISCONTINUED | OUTPATIENT
Start: 2022-06-01 | End: 2022-06-01 | Stop reason: HOSPADM

## 2022-06-01 RX ORDER — DEXTROMETHORPHAN/PSEUDOEPHED 2.5-7.5/.8
1.2 DROPS ORAL
Status: DISCONTINUED | OUTPATIENT
Start: 2022-06-01 | End: 2022-06-01 | Stop reason: HOSPADM

## 2022-06-01 RX ORDER — EPINEPHRINE 0.1 MG/ML
1 INJECTION INTRACARDIAC; INTRAVENOUS
Status: DISCONTINUED | OUTPATIENT
Start: 2022-06-01 | End: 2022-06-01 | Stop reason: HOSPADM

## 2022-06-01 RX ORDER — FENTANYL CITRATE 50 UG/ML
100 INJECTION, SOLUTION INTRAMUSCULAR; INTRAVENOUS
Status: DISCONTINUED | OUTPATIENT
Start: 2022-06-01 | End: 2022-06-01 | Stop reason: HOSPADM

## 2022-06-01 RX ORDER — SODIUM CHLORIDE 9 MG/ML
50 INJECTION, SOLUTION INTRAVENOUS CONTINUOUS
Status: DISCONTINUED | OUTPATIENT
Start: 2022-06-01 | End: 2022-06-01 | Stop reason: HOSPADM

## 2022-06-01 RX ORDER — PROPOFOL 10 MG/ML
INJECTION, EMULSION INTRAVENOUS
Status: DISCONTINUED | OUTPATIENT
Start: 2022-06-01 | End: 2022-06-01 | Stop reason: HOSPADM

## 2022-06-01 RX ORDER — PROPOFOL 10 MG/ML
INJECTION, EMULSION INTRAVENOUS AS NEEDED
Status: DISCONTINUED | OUTPATIENT
Start: 2022-06-01 | End: 2022-06-01 | Stop reason: HOSPADM

## 2022-06-01 RX ORDER — MIDAZOLAM HYDROCHLORIDE 1 MG/ML
.25-5 INJECTION, SOLUTION INTRAMUSCULAR; INTRAVENOUS
Status: DISCONTINUED | OUTPATIENT
Start: 2022-06-01 | End: 2022-06-01 | Stop reason: HOSPADM

## 2022-06-01 RX ADMIN — SODIUM CHLORIDE: 9 INJECTION, SOLUTION INTRAVENOUS at 14:40

## 2022-06-01 RX ADMIN — PROPOFOL 120 MCG/KG/MIN: 10 INJECTION, EMULSION INTRAVENOUS at 15:07

## 2022-06-01 RX ADMIN — PROPOFOL 30 MG: 10 INJECTION, EMULSION INTRAVENOUS at 15:10

## 2022-06-01 RX ADMIN — PROPOFOL 30 MG: 10 INJECTION, EMULSION INTRAVENOUS at 15:14

## 2022-06-01 RX ADMIN — PROPOFOL 30 MG: 10 INJECTION, EMULSION INTRAVENOUS at 15:08

## 2022-06-01 RX ADMIN — PROPOFOL 70 MG: 10 INJECTION, EMULSION INTRAVENOUS at 15:07

## 2022-06-01 NOTE — H&P
Gastroenterology Outpatient History and Physical    Patient: Angi Diaz    Physician: Michell Jimenez MD    Vital Signs: See nursing notes    Allergies: No Known Allergies    Chief Complaint: Colon cancer screening    History of Present Illness: Personal history of pneumonia, cholecystectomy; has mild bile duct dilatation attributed to cholecystectomy. She does not have chest pain, difficulty breathing, edema    Justification for Procedure: Colon cancer screening    History:  Past Medical History:   Diagnosis Date    Hypertension     No past surgical history on file. Social History     Socioeconomic History    Marital status: SINGLE   Tobacco Use    Smoking status: Never Smoker    Smokeless tobacco: Never Used   Substance and Sexual Activity    Alcohol use: Yes     Comment: everyday - drink beer and liquor    Drug use: Never    No family history on file. Medications:   Prior to Admission medications    Medication Sig Start Date End Date Taking? Authorizing Provider   amoxicillin-clavulanate (Augmentin) 875-125 mg per tablet Take 1 Tablet by mouth two (2) times a day. 12/2/21   Fawad Odom MD   amLODIPine (NORVASC) 10 mg tablet Take 10 mg by mouth daily. Kathy Branch MD   lisinopriL (PRINIVIL, ZESTRIL) 40 mg tablet Take 40 mg by mouth daily. Kathy rBanch MD   aspirin (ASPIRIN) 325 mg tablet Take 325 mg by mouth daily. Kathy Branch MD   rosuvastatin (CRESTOR) 40 mg tablet Take 40 mg by mouth nightly. Kathy Branch MD       Physical Exam:   General: alert, cooperative, no distress   HEENT: Head: Normal, normocephalic, atraumatic.    Heart: regular rate and rhythm   Lungs: chest clear, no wheezing, rales, normal symmetric air entry   Abdominal: Bowel sounds are normal, liver is not enlarged, spleen is not enlarged           Findings/Diagnosis: Colon cancer screening    Plan of Care/Planned Procedure: I've discussed colonoscopy possible biopsy, polypectomy, cautery, injection, alternatives, complications including but not limited to pain, cardiopulmonary event, bleeding, perforation requiring additional blood transfusion or operative repair; all questions answered.

## 2022-06-01 NOTE — PERIOP NOTES

## 2022-06-01 NOTE — ANESTHESIA PREPROCEDURE EVALUATION
Relevant Problems   No relevant active problems       Anesthetic History   No history of anesthetic complications            Review of Systems / Medical History  Patient summary reviewed and pertinent labs reviewed    Pulmonary  Within defined limits                 Neuro/Psych   Within defined limits           Cardiovascular    Hypertension                   GI/Hepatic/Renal  Within defined limits              Endo/Other  Within defined limits           Other Findings              Physical Exam    Airway  Mallampati: II  TM Distance: 4 - 6 cm  Neck ROM: normal range of motion   Mouth opening: Normal     Cardiovascular  Regular rate and rhythm,  S1 and S2 normal,  no murmur, click, rub, or gallop  Rhythm: regular  Rate: normal         Dental  No notable dental hx       Pulmonary  Breath sounds clear to auscultation               Abdominal  GI exam deferred       Other Findings            Anesthetic Plan    ASA: 2  Anesthesia type: MAC          Induction: Intravenous  Anesthetic plan and risks discussed with: Patient

## 2022-06-01 NOTE — PROCEDURES
[address]  [phone]      2022    Colonoscopy Procedure Note  Topher Bruce  :  1955  [place] Medical Record Number: [id number]    Indications:     Screening colonoscopy  PCP:  Nikhil Benjamin MD  Anesthesia/Sedation: see nursing notes  Endoscopist:  Dr. Mary Ervin  Assistants: None  Complications:  None  Estimate Blood Loss:  None    Permit:  The indications, risks, benefits and alternatives were reviewed with the patient or their decision maker who was provided an opportunity to ask questions and all questions were answered. The specific risks of colonoscopy with conscious sedation were reviewed, including but not limited to anesthetic complication, bleeding, adverse drug reaction, missed lesion, infection, IV site reactions, and intestinal perforation which would lead to the need for surgical repair. Alternatives to colonoscopy including radiographic imaging, observation without testing, or laboratory testing were reviewed including the limitations of those alternatives. After considering the options and having all their questions answered, the patient or their decision maker provided both verbal and written consent to proceed. Procedure in Detail:  After obtaining informed consent, positioning of the patient in the left lateral decubitus position, and conduction of a pre-procedure pause or \"time out\" the endoscope was introduced into the anus and advanced to the cecum, which was identified by the ileocecal valve and appendiceal orifice. The quality of the colonic preparation was good. A careful inspection was made as the colonoscope was withdrawn, findings and interventions are described below.     Appendiceal orifice photographed    Findings:       - Diverticulosis  Single sessile 6 mm polyp on the ileocecal valve; examination is otherwise unremarkable    Specimens:    Polyp removed cold snare    Implants: None    Complications:   None; patient tolerated the procedure well. Estimated blood loss: none    Impression:  Small colon polyp, several descending and sigmoid diverticulosis without inflammation or other identifiable abnormality    Recommendations:      - High-fiber diet   In the absence of new intestinal complaints no follow-up examination recommended    Thank you for entrusting me with this patient's care. Please do not hesitate to contact me with any questions or if I can be of assistance with any of your other patients' GI needs.     Signed By: Marry Suero MD                        June 1, 2022

## 2022-06-01 NOTE — ANESTHESIA POSTPROCEDURE EVALUATION
Procedure(s):  COLONOSCOPY  ENDOSCOPIC POLYPECTOMY. MAC    Anesthesia Post Evaluation      Multimodal analgesia: multimodal analgesia not used between 6 hours prior to anesthesia start to PACU discharge  Patient location during evaluation: PACU  Patient participation: complete - patient participated  Level of consciousness: awake  Pain management: adequate  Airway patency: patent  Anesthetic complications: no  Cardiovascular status: acceptable, blood pressure returned to baseline and hemodynamically stable  Respiratory status: acceptable  Hydration status: acceptable  Post anesthesia nausea and vomiting:  controlled      INITIAL Post-op Vital signs:   Vitals Value Taken Time   /61 06/01/22 1535   Temp 36.8 °C (98.3 °F) 06/01/22 1535   Pulse 93 06/01/22 1537   Resp 15 06/01/22 1537   SpO2 100 % 06/01/22 1537   Vitals shown include unvalidated device data.

## 2022-06-01 NOTE — PROGRESS NOTES
Vitaly Salas Joe  1955  [id number]    Situation:  Verbal report received from: Alie Nolan RN   Procedure: Procedure(s):  COLONOSCOPY  ENDOSCOPIC POLYPECTOMY    Background:    Preoperative diagnosis: Personal history of colonic polyps [Z86.010]  Bile duct proliferation [K83.8]  Abnormal radiological findings in skin and subcutaneous tissue [R93.89]  Postoperative diagnosis: 1.- Colonic Polyp  2.- Diverticulosis    :  Dr. Suzanna Jeans  Assistant(s): Endoscopy Technician-1: Hernando Shah  Endoscopy Technician-2: Stefan Rose  Endoscopy RN-1: Brett Del Cid RN    Specimens:   ID Type Source Tests Collected by Time Destination   1 : Polyp Preservative Colon, Ileocecal valve  Deidre Carpenter MD 6/1/2022 1528 Pathology     H. Pylori  no    Assessment:    Anesthesia gave intra-procedure sedation and medications, see anesthesia flow sheet no    Intravenous fluids: NS@ KVO     Vital signs stable     Abdominal assessment: round and soft     Recommendation:  Discharge patient per MD order.   Return to floor  Family or Friend   Permission to share finding with family or friend yes

## 2022-06-01 NOTE — DISCHARGE INSTRUCTIONS
David Rota  [id number]  1955    DISCHARGE INSTRUCTIONS    Results:    one very small polyp removed from the colon; no cancer and no urgent finding. Multiple weak spots in the wall of the colon, diverticulosis. There is no infection and therefore no diverticulitis. Discomfort:  Redness at IV site- apply warm compress to area; if redness or soreness persist- contact your physician. There may be a slight amount of blood passed from the rectum. Gaseous discomfort - walking, belching will help relieve any discomfort. You may not operate a vehicle for 12 hours. You may not engage in an occupation involving machinery or appliances for rest of today. You may not drink alcoholic beverages for at least 12 hours. Avoid making any critical decisions for at least 24 hours. DIET:   High fiber diet. Medications:                Resume usual medications today   ACTIVITY:  You may resume your normal daily activities it is recommended that you spend the remainder of the day resting -  avoid any strenuous activity. CALL M.D. ANY SIGN OF:   Increasing pain, nausea, vomiting  Abdominal distension (swelling)  New increased bleeding (oral or rectal)  Fever (chills)  Pain in chest area  Bloody discharge from nose or mouth  Shortness of breath     Follow-up Instructions:  Call Dr. Lexy Hay if you have any questions or problems.   Unless you have a specific complaint routine follow-up examination likely not necessary        DISCHARGE SUMMARY from Nurse    The following personal items collected during your admission are returned to you:   Dental Appliance: Dental Appliances: Ralene Mauston (partial lower and full dentures uppers)  Vision: Visual Aid: None  Hearing Aid:    Jewelry:    Clothing:    Other Valuables:    Valuables sent to safe:

## 2022-06-01 NOTE — PROGRESS NOTES
Endoscopy discharge instructions have been reviewed and given to patient. The patient verbalized understanding and acceptance of instructions. Dr. Suzanne Street discussed with patient procedure findings and next steps.

## 2025-01-08 ENCOUNTER — APPOINTMENT (OUTPATIENT)
Facility: HOSPITAL | Age: 70
End: 2025-01-08
Payer: MEDICARE

## 2025-01-08 ENCOUNTER — HOSPITAL ENCOUNTER (EMERGENCY)
Facility: HOSPITAL | Age: 70
Discharge: HOME | End: 2025-01-08
Attending: STUDENT IN AN ORGANIZED HEALTH CARE EDUCATION/TRAINING PROGRAM
Payer: MEDICARE

## 2025-01-08 VITALS
BODY MASS INDEX: 29.05 KG/M2 | WEIGHT: 174.38 LBS | DIASTOLIC BLOOD PRESSURE: 72 MMHG | TEMPERATURE: 98 F | HEIGHT: 65 IN | OXYGEN SATURATION: 98 % | RESPIRATION RATE: 18 BRPM | HEART RATE: 94 BPM | SYSTOLIC BLOOD PRESSURE: 127 MMHG

## 2025-01-08 DIAGNOSIS — R51.9 NONINTRACTABLE EPISODIC HEADACHE, UNSPECIFIED HEADACHE TYPE: ICD-10-CM

## 2025-01-08 DIAGNOSIS — J34.89 SINUS PRESSURE: Primary | ICD-10-CM

## 2025-01-08 DIAGNOSIS — H53.8 BLURRED VISION, BILATERAL: ICD-10-CM

## 2025-01-08 LAB
ALBUMIN SERPL-MCNC: 3.9 G/DL (ref 3.5–5)
ALBUMIN/GLOB SERPL: 0.8 (ref 1.1–2.2)
ALP SERPL-CCNC: 84 U/L (ref 45–117)
ALT SERPL-CCNC: 18 U/L (ref 12–78)
ANION GAP SERPL CALC-SCNC: 6 MMOL/L (ref 2–12)
AST SERPL-CCNC: 18 U/L (ref 15–37)
BASOPHILS # BLD: 0.04 K/UL (ref 0–0.1)
BASOPHILS NFR BLD: 0.9 % (ref 0–1)
BILIRUB SERPL-MCNC: 0.3 MG/DL (ref 0.2–1)
BUN SERPL-MCNC: 9 MG/DL (ref 6–20)
BUN/CREAT SERPL: 11 (ref 12–20)
CALCIUM SERPL-MCNC: 9.8 MG/DL (ref 8.5–10.1)
CHLORIDE SERPL-SCNC: 104 MMOL/L (ref 97–108)
CO2 SERPL-SCNC: 28 MMOL/L (ref 21–32)
COMMENT:: NORMAL
CREAT SERPL-MCNC: 0.79 MG/DL (ref 0.55–1.02)
DIFFERENTIAL METHOD BLD: NORMAL
EOSINOPHIL # BLD: 0.05 K/UL (ref 0–0.4)
EOSINOPHIL NFR BLD: 1.1 % (ref 0–7)
ERYTHROCYTE [DISTWIDTH] IN BLOOD BY AUTOMATED COUNT: 12.4 % (ref 11.5–14.5)
GLOBULIN SER CALC-MCNC: 4.9 G/DL (ref 2–4)
GLUCOSE SERPL-MCNC: 117 MG/DL (ref 65–100)
HCT VFR BLD AUTO: 42.6 % (ref 35–47)
HGB BLD-MCNC: 13.8 G/DL (ref 11.5–16)
IMM GRANULOCYTES # BLD AUTO: 0.01 K/UL (ref 0–0.04)
IMM GRANULOCYTES NFR BLD AUTO: 0.2 % (ref 0–0.5)
LYMPHOCYTES # BLD: 0.85 K/UL (ref 0.8–3.5)
LYMPHOCYTES NFR BLD: 18.3 % (ref 12–49)
MCH RBC QN AUTO: 30.5 PG (ref 26–34)
MCHC RBC AUTO-ENTMCNC: 32.4 G/DL (ref 30–36.5)
MCV RBC AUTO: 94 FL (ref 80–99)
MONOCYTES # BLD: 0.51 K/UL (ref 0–1)
MONOCYTES NFR BLD: 11 % (ref 5–13)
NEUTS SEG # BLD: 3.19 K/UL (ref 1.8–8)
NEUTS SEG NFR BLD: 68.5 % (ref 32–75)
NRBC # BLD: 0 K/UL (ref 0–0.01)
NRBC BLD-RTO: 0 PER 100 WBC
PLATELET # BLD AUTO: 278 K/UL (ref 150–400)
PMV BLD AUTO: 10.3 FL (ref 8.9–12.9)
POTASSIUM SERPL-SCNC: 3.6 MMOL/L (ref 3.5–5.1)
PROT SERPL-MCNC: 8.8 G/DL (ref 6.4–8.2)
RBC # BLD AUTO: 4.53 M/UL (ref 3.8–5.2)
SODIUM SERPL-SCNC: 138 MMOL/L (ref 136–145)
SPECIMEN HOLD: NORMAL
WBC # BLD AUTO: 4.7 K/UL (ref 3.6–11)

## 2025-01-08 PROCEDURE — 70450 CT HEAD/BRAIN W/O DYE: CPT

## 2025-01-08 PROCEDURE — 36415 COLL VENOUS BLD VENIPUNCTURE: CPT

## 2025-01-08 PROCEDURE — 99284 EMERGENCY DEPT VISIT MOD MDM: CPT

## 2025-01-08 PROCEDURE — 80053 COMPREHEN METABOLIC PANEL: CPT

## 2025-01-08 PROCEDURE — 85025 COMPLETE CBC W/AUTO DIFF WBC: CPT

## 2025-01-08 ASSESSMENT — PAIN SCALES - GENERAL: PAINLEVEL_OUTOF10: 7

## 2025-01-08 ASSESSMENT — PAIN DESCRIPTION - ORIENTATION: ORIENTATION: LEFT

## 2025-01-08 ASSESSMENT — PAIN DESCRIPTION - LOCATION: LOCATION: HEAD

## 2025-01-08 ASSESSMENT — PAIN - FUNCTIONAL ASSESSMENT
PAIN_FUNCTIONAL_ASSESSMENT: 0-10
PAIN_FUNCTIONAL_ASSESSMENT: PREVENTS OR INTERFERES SOME ACTIVE ACTIVITIES AND ADLS

## 2025-01-08 ASSESSMENT — PAIN DESCRIPTION - DESCRIPTORS: DESCRIPTORS: ACHING

## 2025-01-08 NOTE — ED NOTES
2:20 PM  69-year-old female presents emergency department with concern for blurred vision, particularly with position change, both eyes, and left-sided headache.  Patient does not typically have headaches similar to this, it is described as intermittent, pressure-like, no weakness or numbness.  She gives a history of hypertension, she states she is not diabetic.  She denies any other medical problems.      I have evaluated the patient as the Provider in Rapid Medical Evaluation (RME). I have reviewed her vital signs and the triage nurse assessment. I have talked with the patient and any available family and advised that I am the provider in triage and have ordered the appropriate study to initiate their work up based on the clinical presentation during my assessment. I have advised that the patient will be accommodated in the Main ED as soon as possible. I have also requested to contact the triage nurse or myself immediately if the patient experiences any changes in their condition during this brief waiting period.  SARAH Encinas Heidi J, PA-C  01/08/25 1111

## 2025-01-08 NOTE — ED TRIAGE NOTES
Patient arrives to ED reports \"not feeling right\", patient states blurry vision since Sunday also reports dizziness with position changes and intermittent headaches.

## 2025-01-08 NOTE — DISCHARGE INSTRUCTIONS
You presented to the ED with intermittent headache since Saturday.  You have some sinus pressure and pain is also intermittent.  It also had some intermittent vision changes for the last month but nothing persistent.  Here in the ED you are asymptomatic.  CT scan of the head shows no acute bleeds or abnormalities.  Your physical exam shows no neurodeficits.  Your blood work is reassuring no anemia or electrolyte abnormalities.  Continue to take your medications as prescribed.  Follow-up with ophthalmology for additional eye examination.  Follow-up with ENT for sinus conditions.  Follow-up with your PCP to coordinate outpatient treatment.  If symptoms change or worsen return to the ED for further evaluation

## 2025-01-09 NOTE — ED PROVIDER NOTES
intermittent headache since Saturday, intermittent blurred vision with certain positions for about 3 to 4 weeks.  No symptoms at this time.  No neurodeficits.  CT scan unremarkable.  No hypertension.  No chest pain shortness of breath.  No electrolyte abnormalities, no signs of dehydration or anemia.  No acute process requiring further emergency medical intervention.  As patient is asymptomatic she is appropriate for discharge home and follow-up with ENT due to sinus concerns, her ophthalmologist due to her intermittent vision changes and her primary care to facilitate outpatient specialty follow-up.  Return precautions given.  Patient discharged home in stable condition          Procedures       DISPOSITION: Decision To Discharge 01/08/2025 05:51:46 PM    CLINICAL IMPRESSION:   1. Sinus pressure    2. Nonintractable episodic headache, unspecified headache type    3. Blurred vision, bilateral         Further personalized recommendations for outpatient care as below.    Key discharge instructions and summary of care provided in AVS: You presented to the ED with intermittent headache since Saturday.  You have some sinus pressure and pain is also intermittent.  It also had some intermittent vision changes for the last month but nothing persistent.  Here in the ED you are asymptomatic.  CT scan of the head shows no acute bleeds or abnormalities.  Your physical exam shows no neurodeficits.  Your blood work is reassuring no anemia or electrolyte abnormalities.  Continue to take your medications as prescribed.  Follow-up with ophthalmology for additional eye examination.  Follow-up with ENT for sinus conditions.  Follow-up with your PCP to coordinate outpatient treatment.  If symptoms change or worsen return to the ED for further evaluation    The patient has been re-evaluated and feeling better. Patient is stable for discharge.  All available radiology and laboratory results have been reviewed with patient and/or

## (undated) DEVICE — CATH IV AUTOGRD BC BLU 22GA 25 -- INSYTE

## (undated) DEVICE — KIT COLON W/ 1.1OZ LUB AND 2 END

## (undated) DEVICE — Device

## (undated) DEVICE — 1200 GUARD II KIT W/5MM TUBE W/O VAC TUBE: Brand: GUARDIAN

## (undated) DEVICE — (D)SENSOR RMFG 02 PULS OXMTR -- DISC BY MFR USE ITEM 133445

## (undated) DEVICE — SOLIDIFIER MEDC 1200ML -- CONVERT TO 356117

## (undated) DEVICE — SET ADMIN 16ML TBNG L100IN 2 Y INJ SITE IV PIGGY BK DISP

## (undated) DEVICE — BLUNTFILL: Brand: MONOJECT

## (undated) DEVICE — SNARE ENDOSCP M L240CM W27MM SHTH DIA2.4MM CHN 2.8MM OVL

## (undated) DEVICE — BAG BELONG PT PERS CLEAR HANDL

## (undated) DEVICE — Device: Brand: DISPOSABLE ELECTROSURGICAL SNARE

## (undated) DEVICE — BAG SPEC BIOHZRD 10 X 10 IN --

## (undated) DEVICE — CONTAINER SPEC 20 ML LID NEUT BUFF FORMALIN 10 % POLYPR STS

## (undated) DEVICE — POLYP TRAP: Brand: TRAPEASE®

## (undated) DEVICE — SIMPLICITY FLUFF UNDERPAD 23X36, MODERATE: Brand: SIMPLICITY

## (undated) DEVICE — ELECTRODE,RADIOTRANSLUCENT,FOAM,3PK: Brand: MEDLINE

## (undated) DEVICE — BASIN EMSIS 16OZ GRAPHITE PLAS KID SHP MOLD GRAD FOR ORAL